# Patient Record
Sex: FEMALE | URBAN - METROPOLITAN AREA
[De-identification: names, ages, dates, MRNs, and addresses within clinical notes are randomized per-mention and may not be internally consistent; named-entity substitution may affect disease eponyms.]

---

## 2017-02-24 ENCOUNTER — APPOINTMENT (RX ONLY)
Dept: URBAN - METROPOLITAN AREA CLINIC 143 | Facility: CLINIC | Age: 37
Setting detail: DERMATOLOGY
End: 2017-02-24

## 2017-02-24 VITALS — WEIGHT: 138 LBS | HEIGHT: 68 IN

## 2017-02-24 DIAGNOSIS — L72.8 OTHER FOLLICULAR CYSTS OF THE SKIN AND SUBCUTANEOUS TISSUE: ICD-10-CM

## 2017-02-24 DIAGNOSIS — L70.0 ACNE VULGARIS: ICD-10-CM

## 2017-02-24 PROCEDURE — ? OTHER

## 2017-02-24 PROCEDURE — ? DEFER

## 2017-02-24 PROCEDURE — ? INTRALESIONAL KENALOG

## 2017-02-24 PROCEDURE — ? COUNSELING

## 2017-02-24 PROCEDURE — 99202 OFFICE O/P NEW SF 15 MIN: CPT | Mod: 25

## 2017-02-24 PROCEDURE — 11900 INJECT SKIN LESIONS </W 7: CPT

## 2017-02-24 PROCEDURE — ? PRESCRIPTION

## 2017-02-24 RX ORDER — DOXYCYCLINE HYCLATE 100 MG/1
CAPSULE, GELATIN COATED ORAL BID
Qty: 40 | Refills: 0 | Status: ERX | COMMUNITY
Start: 2017-02-24

## 2017-02-24 RX ADMIN — DOXYCYCLINE HYCLATE: 100 CAPSULE, GELATIN COATED ORAL at 00:00

## 2017-02-24 ASSESSMENT — LOCATION DETAILED DESCRIPTION DERM: LOCATION DETAILED: LEFT SUPERIOR MEDIAL MALAR CHEEK

## 2017-02-24 ASSESSMENT — LOCATION SIMPLE DESCRIPTION DERM: LOCATION SIMPLE: LEFT CHEEK

## 2017-02-24 ASSESSMENT — LOCATION ZONE DERM: LOCATION ZONE: FACE

## 2017-02-24 NOTE — PROCEDURE: INTRALESIONAL KENALOG
Total Volume Injected (Ccs- Only Use Numbers And Decimals): .2
Administered By (Optional): Rajinder Pryor PA-C
Expiration Date (Optional): 06/2018
Detail Level: Detailed
Medical Necessity Clause: This procedure was medically necessary because the lesions that were treated were:
Concentration Of Solution Injected (Mg/Ml): 10.0
X Size Of Lesion In Cm (Optional): 0
Include Z78.9 (Other Specified Conditions Influencing Health Status) As An Associated Diagnosis?: No
Consent: The risks of atrophy were reviewed with the patient.
Kenalog Preparation: Kenalog
Lot # (Optional): [id number]

## 2017-02-24 NOTE — PROCEDURE: OTHER
Other (Free Text): samples of veltin given to patient
Note Text (......Xxx Chief Complaint.): This diagnosis correlates with the
Detail Level: Zone

## 2017-02-24 NOTE — PROCEDURE: DEFER
Detail Level: Zone
Instructions (Optional): will try ILK and oral abx first , if failed will schedule for excision. Discussed the risk of a linear scar.
Procedure To Be Performed At Next Visit: Excision

## 2023-02-24 ENCOUNTER — APPOINTMENT (RX ONLY)
Dept: URBAN - METROPOLITAN AREA CLINIC 143 | Facility: CLINIC | Age: 43
Setting detail: DERMATOLOGY
End: 2023-02-24

## 2023-02-24 DIAGNOSIS — I78.1 NEVUS, NON-NEOPLASTIC: ICD-10-CM

## 2023-02-24 PROCEDURE — 99202 OFFICE O/P NEW SF 15 MIN: CPT

## 2023-02-24 PROCEDURE — ? COUNSELING

## 2023-02-24 PROCEDURE — ? DEFER

## 2023-02-24 ASSESSMENT — LOCATION ZONE DERM: LOCATION ZONE: FACE

## 2023-02-24 ASSESSMENT — LOCATION SIMPLE DESCRIPTION DERM: LOCATION SIMPLE: LEFT CHEEK

## 2023-02-24 ASSESSMENT — LOCATION DETAILED DESCRIPTION DERM: LOCATION DETAILED: LEFT CENTRAL MALAR CHEEK

## 2023-02-24 NOTE — PROCEDURE: DEFER
Detail Level: Detailed
Introduction Text (Please End With A Colon): The following procedure was deferred:
X Size Of Lesion In Cm (Optional): 0
Other Procedure: cosmetic laser